# Patient Record
Sex: FEMALE | NOT HISPANIC OR LATINO | Employment: FULL TIME
[De-identification: names, ages, dates, MRNs, and addresses within clinical notes are randomized per-mention and may not be internally consistent; named-entity substitution may affect disease eponyms.]

---

## 2024-07-12 ENCOUNTER — TRANSCRIBE ORDERS (OUTPATIENT)
Dept: OTHER | Age: 54
End: 2024-07-12

## 2024-07-12 DIAGNOSIS — D05.12 DUCTAL CARCINOMA IN SITU OF LEFT BREAST: Primary | ICD-10-CM

## 2024-07-15 ENCOUNTER — PATIENT OUTREACH (OUTPATIENT)
Dept: ONCOLOGY | Facility: CLINIC | Age: 54
End: 2024-07-15
Payer: COMMERCIAL

## 2024-07-15 NOTE — PROGRESS NOTES
New Patient Oncology Nurse Navigator Note     Referring provider: Dr. Edward Michaels     Referring Clinic/Organization: [facility]     Referred to (specialty:) Radiation Oncology      Date Referral Received: July 15, 2024     Evaluation for:  D05.12 (ICD-10-CM) - Ductal carcinoma in situ of left breast     Clinical History (per Nurse review of records provided):      Josefa is a 53 year old female who had bilateral screening mammogram on 5/1/24. A focal asymmetry was identified in the upper outer quadrant, middle depth, approximately 6 cm from the nipple. Diagnostic imaging followed on 5/10 and additional spot compression views demonstrate persistence of the asymmetry.      Ultrasound at the 2 o'clock position 6 cm from the nipple demonstrates an ill-defined hypoechoic mass measuring 0.7 x 0.5 x 0.6 cm.     5/22/24 - Case: [id number]   A) LEFT BREAST, 2:00, 6 CM FROM NIPPLE, ULTRASOUND-GUIDED CORE BIOPSY:   1. Detached fragments of necrotic debris with associated rare atypical epithelial cells   2. Detached fragment of reactive appearing lymphoid tissue   3. See comment   Comment : A) Histologic sections show detached fragments of necrotic debris with rare associated atypical epithelial cells. The detached and minute nature of the fragments precludes definitive evaluation. The detached fragment of lymphoid tissue may represent sampling of a lymph node. Repeat biopsy sampling or conservative excision is recommended to further evaluate this process.     5/28/24 - Consult with Dr. Edward Michaels and proceeded with surgery.    7/9/24 -  Case: [id number]   A) LEFT BREAST, LUMPECTOMY:   1. Ductal carcinoma in situ (DCIS), nuclear grade 2, Cribriform type       a. Size: 10 mm       b. Margins: 2 mm from the posterior margin       c. Core biopsy site is associated with tumor   2. Breast Ancillary Testing: Performed on A7       a. Estrogen receptor: Positive (%, strong staining by manual morphometry)   3.  Negative for invasive malignancy     B) LEFT BREAST, MEDIAL/SUPERIOR MARGIN, RE-EXCISION WITH EVALUATION OF SURGICAL MARGINS:   1. Focal atypical ductal hyperplasia, >3 mm from all margins   2. Proliferative fibrocystic change   3. Negative for invasive carcinoma     Only [facility]ath[facility] PA scheduled for 7/17 and no med onc yet. Patient states they will schedule med onc consult once the rad onc is known.     Patient resides in [place], [place]. ([place] location)     7/15 0930 - Telephoned and spoke with Joseaf. Explained my role and purpose for the call. Writer received referral, reviewed for appropriate plan, and call warm transferred to New Patient Scheduling for completion.      Records Location: Care Everywhere, Media, and See Bookmarked material     Records Needed:  Path reports-biopsy and surgery (per protocol)  Pathology reviews (per protocol)  Breast imaging for past 5 years  Med onc notes, OP note, surgeons note (per protocol)

## 2024-07-19 ENCOUNTER — OFFICE VISIT (OUTPATIENT)
Dept: RADIATION ONCOLOGY | Facility: CLINIC | Age: 54
End: 2024-07-19
Attending: SURGERY
Payer: COMMERCIAL

## 2024-07-19 VITALS
TEMPERATURE: 98 F | OXYGEN SATURATION: 99 % | HEART RATE: 76 BPM | DIASTOLIC BLOOD PRESSURE: 68 MMHG | RESPIRATION RATE: 18 BRPM | SYSTOLIC BLOOD PRESSURE: 100 MMHG

## 2024-07-19 DIAGNOSIS — D05.12 DUCTAL CARCINOMA IN SITU (DCIS) OF LEFT BREAST: Primary | ICD-10-CM

## 2024-07-19 PROCEDURE — G2211 COMPLEX E/M VISIT ADD ON: HCPCS | Performed by: SURGERY

## 2024-07-19 PROCEDURE — 99205 OFFICE O/P NEW HI 60 MIN: CPT | Performed by: SURGERY

## 2024-07-19 PROCEDURE — 99417 PROLNG OP E/M EACH 15 MIN: CPT | Performed by: SURGERY

## 2024-07-19 RX ORDER — HYDROCHLOROTHIAZIDE 50 MG/1
1 TABLET ORAL DAILY
COMMUNITY
Start: 2024-03-01

## 2024-07-19 RX ORDER — LANSOPRAZOLE 30 MG/1
30 CAPSULE, DELAYED RELEASE ORAL
COMMUNITY
Start: 2024-03-01

## 2024-07-19 RX ORDER — LISINOPRIL 30 MG/1
30 TABLET ORAL
COMMUNITY
Start: 2024-03-01

## 2024-07-19 RX ORDER — ASPIRIN 81 MG/1
81 TABLET ORAL
COMMUNITY
Start: 2023-12-01

## 2024-07-19 RX ORDER — ATORVASTATIN CALCIUM 20 MG/1
1 TABLET, FILM COATED ORAL AT BEDTIME
COMMUNITY
Start: 2024-03-01

## 2024-07-19 RX ORDER — ORAL SEMAGLUTIDE 7 MG/1
14 TABLET ORAL
COMMUNITY
Start: 2023-10-30

## 2024-07-19 ASSESSMENT — PAIN SCALES - GENERAL: PAINLEVEL: NO PAIN (1)

## 2024-07-19 NOTE — LETTER
2024      Josefa Arnold  [address]  [place] [address]      Dear Colleague,    Thank you for referring your patient, Josefa Arnold, to the [facility]COL[facility] [place]. Please see a copy of my visit note below.       Department of Radiation Oncology  [facility]  [facility]  [address] [address] [place] [address]  [phone]       Consultation Note    Name: Josefa Arnold MRN: [id number]   : 1970   Date of Service: 2024 Referring: Dr. Michaels     Reason for consultation: LEFT Breast DCIS    History of Present Illness     Ms. Arnold is a 53 year old female with LEFT Stage 0 DCIS, ER+ s/p breast conservation surgery (Dr. Michaels, 2024) with pathology revealing ductal carcinoma in situ, grade 2, measuring 10 mm in size negative margins. She will be seeing Dr. Ryan for discussion of endocrine therapy.    Briefly, oncologic history is as follows:    Patient underwent a screening mammogram on 2024 which identified a focal asymmetry in the left breast.    A diagnostic mammogram and ultrasound was obtained on 2024 which revealed a persistent asymmetry in the left breast, with ultrasound demonstrating an ill-defined hypoechoic mass measuring up to 7 mm in size, at the 2 o'clock position, 6 cm from the nipple.  This is characterized as a BI-RADS 4 abnormality.    She underwent biopsy on 2024, with pathology returning as necrotic debris with atypical epithelial cells.    She underwent breast conservation surgery on 2024 with Dr. Michaels.  Pathology returned as a ductal carcinoma in situ, grade 2, measuring 10 mm in size, negative margins, ER positive.    She will be seeing Dr. Ryan medical oncology next week for discussion of endocrine therapy.    Today, patient denies any significant breast tenderness, pain, drainage, fevers, chills, extremity range of motion difficulties, chest pain,  dyspnea, or weight loss.     Past Medical History:   Past Medical History:   Diagnosis Date     DCIS (ductal carcinoma in situ) of breast 2024    Left Breast     Diabetes mellitus, type 2 (H)      HTN (hypertension)        Past Surgical History:   Past Surgical History:   Procedure Laterality Date     BREAST BIOPSY, CORE RT/LT Left 2024     BREAST LUMPECTOMY, RT/LT Left 2024    Dr. Edward Michaels      SECTION       EXTRACTION OF WISDOM TEETH       TONSILLECTOMY         Chemotherapy History:  No prior chemotherapy    Radiation History:  No prior radiation     Pregnant: No,Postmenopausal  Implanted Cardiac Devices: No    Medications:  Current Outpatient Medications   Medication Sig Dispense Refill     aspirin 81 MG EC tablet Take 81 mg by mouth       atorvastatin (LIPITOR) 20 MG tablet Take 1 tablet by mouth at bedtime       hydrochlorothiazide (HYDRODIURIL) 50 MG tablet Take 1 tablet by mouth daily       LANsoprazole (PREVACID) 30 MG DR capsule Take 30 mg by mouth       lisinopril (ZESTRIL) 30 MG tablet Take 30 mg by mouth       RYBELSUS 7 MG tablet 14 mg       No current facility-administered medications for this visit.         Allergies:     Allergies   Allergen Reactions     Nitrofurantoin Rash       Social History:  Social History     Tobacco Use     Smoking status: Never     Smokeless tobacco: Never   Substance Use Topics     Alcohol use: Not Currently     Drug use: Never         Family History:  Family History   Problem Relation Age of Onset     Breast Cancer Paternal Grandmother 92     Ovarian Cancer No family hx of      Prostate Cancer No family hx of        Review of Systems   A 10-point review of systems was performed. Pertinent findings are noted in the HPI.    Physical Exam   ECOG Status: 1    Vitals:  /68 (BP Location: Left arm, Patient Position: Chair, Cuff Size: Adult Large)   Pulse 76   Temp 98  F (36.7  C) (Oral)   Resp 18   SpO2 99%     Gen: Alert, in NAD  Head:  "NC/AT  Eyes: PERRL, EOMI, sclera anicteric  Ears: No external auricular lesions  Nose/sinus: No rhinorrhea or epistaxis  Breast: LEFT breast incision healing well, incision CDI, no dehiscence, no upper extremity range of motion limitation     Oral cavity/oropharynx: MMM, no visible oral cavity lesions  Neck: Full ROM, supple, no palpable adenopathy  Pulm: No wheezing, stridor or respiratory distress  CV: Extremities are warm and well-perfused, no cyanosis, no pedal edema  Abdominal: Normal bowel sounds, soft, nontender, no masses  Musculoskeletal: Normal bulk and tone  Skin: Normal color and turgor  Neuro: A/Ox3, CN II-XII intact, normal gait    Imaging/Path/Labs   Imaging: per HPI, personally reviewed and in agreement     Path: per HPI, personally reviewed and in agreement     Labs: per HPI, personally reviewed and in agreement     I have personally reviewed notes from Dr. Michaels    Assessment      Ms. Arnold is a 53 year old female with LEFT Stage 0 DCIS, ER+ s/p breast conservation surgery (Dr. Michaels, 7/9/2024) with pathology revealing ductal carcinoma in situ, grade 2, measuring 10 mm in size negative margins. She will be seeing Dr. Ryan for discussion of endocrine therapy.    We reviewed that the standard of care for the treatment of ductal carcinoma in-situ of the breast in the setting of breast conservation is consideration of adjuvant radiation of the breast. We discussed the etiology of the disease and the overall favorable prognosis of DCIS. In particular, we discussed the benefits of adjuvant radiation therapy would be for reduction in risk of intra-breast tumor recurrences (more DCIS or invasive carcinoma) by approximately 50% without an overall survival benefit (Dionisio et al, NSABP-17, NEJM 1993, JCO 1998; EBCTG [place]-analysis, JNCI, 2010).     With respect to omission of radiation, we discussed the results of RTOG 9804 which randomized women with \"low risk\" DCIS, defined as mammographically detected, " <2.5cm, grades 1-2, with surgical margins of at least 3mm, to whole breast irradiation versus observation. While there was an ipsilateral local recurrence benefit to radiation, risk of recurrence with observation is 11% vs 3% with radiation at 12 years.      The options of conventional fractionation versus hypofractionation were reviewed.  Potential options for omission of radiation, and the role of boost were also reviewed.     We discussed the potential acute and delayed toxicities of radiation therapy, as well as the logistics of daily radiation treatment.  The logistics of CT simulation were explained and options for set up, including supine versus prone positioning, and gating were explained.      Additional time was spent specifically discussing the additional risk of cardiovascular disease due to radiation therapy. We reviewed retrospective data attributing a RELATIVE RISK increase of 7.4% per Gy of mean heart dose (Shiloh et al., NEJ, 2013). We discussed the meaning of relative risk in the setting of any other risk factors for cardiovascular disease, irrespective of the cancer diagnosis and/or treatment. Finally, we reviewed treatment techniques utilized in modern radiation therapy to minimize radiation dose to the heart.    Plan     1. After extensive discussion, patient wishes to proceed with adjuvant LEFT whole breast radiation.     2. CT simulation in the near future, with plan to start two weeks after CT simulation is performed. Plan for 40Gy/15fx  without boost.     3. Patient will be with medical oncology (Dr Ryan) and to discuss endocrine therapy after radiation.    All benefits and risks discussed, and patient is in agreement with the oncologic plan discussed above.     Thank you for allowing me to participate in your patient's care.  If you should require any additional information, please do not hesitate in contacting me.        Arash Valverde MD  Department of Radiation Oncology  [facility]  [place]     A total of 90 minutes were spent on this visit, including preparation for this visit, face to face with patient, and medical decision making. Medical decision-making included consideration and possible diagnosis, management options, complex record review, review of diagnostic tests, consideration and discussion of significant complications based up medical history/comorbidities, and discussion with providers involved in care of the patient.         Again, thank you for allowing me to participate in the care of your patient.        Sincerely,        Arash Valverde MD

## 2024-07-19 NOTE — PATIENT INSTRUCTIONS
What to expect at your Simulation visit:    You will meet with a Radiation Therapist and other team members who will be doing a planning session called a  simulation  with you. This process will determine your daily treatment.    ~ You will lie on a flat table and have a treatment planning CT scan.  It is important during the scan to hold very still and breathe normally.    ~ Your therapist may construct a body mold to help you hold still for your treatments.    ~ If you are having treatment to the head or neck area you will be fitted with a plastic mesh mask that fits very snugly over your face and neck.     ~ Your therapist will be taking some digital photos that will go in your treatment chart.      ~Your therapist will make marks on your skin and take measurements. Your therapist may ask you about making small tattoos (a permanent small dot) over these marks.  These marks are used to position you daily for your radiation therapy treatments. Please do not wash off any marks until all of your radiation therapy treatments are complete unless you are instructed to do so by your therapist.    ~ Once the simulation is completed it can take from 3 to 10 business days before you start radiation therapy treatments.    ~ You may meet with a nurse who will go over management of treatment side effects and self care during your treatments. The nurse will help to plan care with other departments and physicians if needed.     Please contact [facility] RN with questions or concerns following today's appointment: [phone].       Please feel free to leave a detailed message if your call is not answered.    If your call is not received before 3:00 PM, it may not be returned until the following business day.    If you are receiving radiation treatment and need assistance after 3:00 PM or on the weekends, please call [phone] and ask to speak to the radiation oncologist on-call.    Thank  you!    Varsha MURO

## 2024-07-19 NOTE — NURSING NOTE
Oncology Rooming Note    July 19, 2024 1:06 PM   Josefa Arnold is a 53 year old female who presents for:    Chief Complaint   Patient presents with    Cancer     Radiation oncology consultation with Dr. Valverde     Initial Vitals: /68 (BP Location: Left arm, Patient Position: Chair, Cuff Size: Adult Large)   Pulse 76   Temp 98  F (36.7  C) (Oral)   Resp 18   SpO2 99%  There is no height or weight on file to calculate BMI. There is no height or weight on file to calculate BSA.  No Pain (1) Comment: Data Unavailable   No LMP recorded. Patient is postmenopausal.  Allergies reviewed: Yes  Medications reviewed: Yes    Medications: Medication refills not needed today.  Pharmacy name entered into EPIC: [id number] - [place], [place] [facility] [address]    Frailty Screening:   Is the patient here for a new oncology consult visit in cancer care? 1. Yes. Over the past month, have you experienced difficulty or required a caregiver to assist with:   1. Balance, walking or general mobility (including any falls)? NO  2. Completion of self-care tasks such as bathing, dressing, toileting, grooming/hygiene?  NO  3. Concentration or memory that affects your daily life?  NO       Clinical concerns: patient presents to clinic with  Gautam for radiation oncology new patient consultation with Dr. Arash Valverde.  Patient was seen in surgical follow-up on 7/17/2024, reports steri-strips intact at this time.  Patient reports she is scheduled for medical oncology consultation with Dr. Ryan on 7/22/2024.    Considerations for radiation treatment   Pregnancy status: Female with documented history of menopause  , patient reports menopause at age 49.  Implanted Cardiac Devices: No   Any previous radiation therapy: No        Dr. Arash Valverde was notified.      Varsha Rosales, RN BSN OCN CBCN

## 2024-07-22 NOTE — PROGRESS NOTES
Department of Radiation Oncology  [facility]: Cancer Center  [facility]  [address] [place] [address]  [phone]       Consultation Note    Name: Josefa Arnold MRN: [id number]   : 1970   Date of Service: 2024 Referring: Dr. Michaels     Reason for consultation: LEFT Breast DCIS    History of Present Illness     Ms. Arnold is a 53 year old female with LEFT Stage 0 DCIS, ER+ s/p breast conservation surgery (Dr. Michaels, 2024) with pathology revealing ductal carcinoma in situ, grade 2, measuring 10 mm in size negative margins. She will be seeing Dr. Ryan for discussion of endocrine therapy.    Briefly, oncologic history is as follows:    Patient underwent a screening mammogram on 2024 which identified a focal asymmetry in the left breast.    A diagnostic mammogram and ultrasound was obtained on 2024 which revealed a persistent asymmetry in the left breast, with ultrasound demonstrating an ill-defined hypoechoic mass measuring up to 7 mm in size, at the 2 o'clock position, 6 cm from the nipple.  This is characterized as a BI-RADS 4 abnormality.    She underwent biopsy on 2024, with pathology returning as necrotic debris with atypical epithelial cells.    She underwent breast conservation surgery on 2024 with Dr. Michaels.  Pathology returned as a ductal carcinoma in situ, grade 2, measuring 10 mm in size, negative margins, ER positive.    She will be seeing Dr. Ryan medical oncology next week for discussion of endocrine therapy.    Today, patient denies any significant breast tenderness, pain, drainage, fevers, chills, extremity range of motion difficulties, chest pain, dyspnea, or weight loss.     Past Medical History:   Past Medical History:   Diagnosis Date    DCIS (ductal carcinoma in situ) of breast 2024    Left Breast    Diabetes mellitus, type 2 (H)     HTN (hypertension)        Past Surgical History:   Past  Surgical History:   Procedure Laterality Date    BREAST BIOPSY, CORE RT/LT Left 2024    BREAST LUMPECTOMY, RT/LT Left 2024    Dr. Edward Michaels     SECTION      EXTRACTION OF WISDOM TEETH      TONSILLECTOMY         Chemotherapy History:  No prior chemotherapy    Radiation History:  No prior radiation     Pregnant: No,Postmenopausal  Implanted Cardiac Devices: No    Medications:  Current Outpatient Medications   Medication Sig Dispense Refill    aspirin 81 MG EC tablet Take 81 mg by mouth      atorvastatin (LIPITOR) 20 MG tablet Take 1 tablet by mouth at bedtime      hydrochlorothiazide (HYDRODIURIL) 50 MG tablet Take 1 tablet by mouth daily      LANsoprazole (PREVACID) 30 MG DR capsule Take 30 mg by mouth      lisinopril (ZESTRIL) 30 MG tablet Take 30 mg by mouth      RYBELSUS 7 MG tablet 14 mg       No current facility-administered medications for this visit.         Allergies:     Allergies   Allergen Reactions    Nitrofurantoin Rash       Social History:  Social History     Tobacco Use    Smoking status: Never    Smokeless tobacco: Never   Substance Use Topics    Alcohol use: Not Currently    Drug use: Never         Family History:  Family History   Problem Relation Age of Onset    Breast Cancer Paternal Grandmother 92    Ovarian Cancer No family hx of     Prostate Cancer No family hx of        Review of Systems   A 10-point review of systems was performed. Pertinent findings are noted in the HPI.    Physical Exam   ECOG Status: 1    Vitals:  /68 (BP Location: Left arm, Patient Position: Chair, Cuff Size: Adult Large)   Pulse 76   Temp 98  F (36.7  C) (Oral)   Resp 18   SpO2 99%     Gen: Alert, in NAD  Head: NC/AT  Eyes: PERRL, EOMI, sclera anicteric  Ears: No external auricular lesions  Nose/sinus: No rhinorrhea or epistaxis  Breast: LEFT breast incision healing well, incision CDI, no dehiscence, no upper extremity range of motion limitation     Oral cavity/oropharynx: MMM, no visible  "oral cavity lesions  Neck: Full ROM, supple, no palpable adenopathy  Pulm: No wheezing, stridor or respiratory distress  CV: Extremities are warm and well-perfused, no cyanosis, no pedal edema  Abdominal: Normal bowel sounds, soft, nontender, no masses  Musculoskeletal: Normal bulk and tone  Skin: Normal color and turgor  Neuro: A/Ox3, CN II-XII intact, normal gait    Imaging/Path/Labs   Imaging: per HPI, personally reviewed and in agreement     Path: per HPI, personally reviewed and in agreement     Labs: per HPI, personally reviewed and in agreement     I have personally reviewed notes from Dr. Michaels    Assessment      Ms. Arnold is a 53 year old female with LEFT Stage 0 DCIS, ER+ s/p breast conservation surgery (Dr. Michaels, 7/9/2024) with pathology revealing ductal carcinoma in situ, grade 2, measuring 10 mm in size negative margins. She will be seeing Dr. Ryan for discussion of endocrine therapy.    We reviewed that the standard of care for the treatment of ductal carcinoma in-situ of the breast in the setting of breast conservation is consideration of adjuvant radiation of the breast. We discussed the etiology of the disease and the overall favorable prognosis of DCIS. In particular, we discussed the benefits of adjuvant radiation therapy would be for reduction in risk of intra-breast tumor recurrences (more DCIS or invasive carcinoma) by approximately 50% without an overall survival benefit (Dionisio et al, NSABP-17, NEJ 1993, JCO 1998; EBCTG [place]-analysis, JNCI, 2010).     With respect to omission of radiation, we discussed the results of RTOG 9804 which randomized women with \"low risk\" DCIS, defined as mammographically detected, <2.5cm, grades 1-2, with surgical margins of at least 3mm, to whole breast irradiation versus observation. While there was an ipsilateral local recurrence benefit to radiation, risk of recurrence with observation is 11% vs 3% with radiation at 12 years.      The options of conventional " fractionation versus hypofractionation were reviewed.  Potential options for omission of radiation, and the role of boost were also reviewed.     We discussed the potential acute and delayed toxicities of radiation therapy, as well as the logistics of daily radiation treatment.  The logistics of CT simulation were explained and options for set up, including supine versus prone positioning, and gating were explained.      Additional time was spent specifically discussing the additional risk of cardiovascular disease due to radiation therapy. We reviewed retrospective data attributing a RELATIVE RISK increase of 7.4% per Gy of mean heart dose (Shiloh et al., NEJ, 2013). We discussed the meaning of relative risk in the setting of any other risk factors for cardiovascular disease, irrespective of the cancer diagnosis and/or treatment. Finally, we reviewed treatment techniques utilized in modern radiation therapy to minimize radiation dose to the heart.    Plan     1. After extensive discussion, patient wishes to proceed with adjuvant LEFT whole breast radiation.     2. CT simulation in the near future, with plan to start two weeks after CT simulation is performed. Plan for 40Gy/15fx  without boost.     3. Patient will be with medical oncology (Dr Ryan) and to discuss endocrine therapy after radiation.    All benefits and risks discussed, and patient is in agreement with the oncologic plan discussed above.     Thank you for allowing me to participate in your patient's care.  If you should require any additional information, please do not hesitate in contacting me.        Arash Valverde MD  Department of Radiation Oncology  [facility]     A total of 90 minutes were spent on this visit, including preparation for this visit, face to face with patient, and medical decision making. Medical decision-making included consideration and possible diagnosis, management options, complex record review, review of diagnostic  tests, consideration and discussion of significant complications based up medical history/comorbidities, and discussion with providers involved in care of the patient.

## 2024-08-01 ENCOUNTER — APPOINTMENT (OUTPATIENT)
Dept: RADIATION ONCOLOGY | Facility: CLINIC | Age: 54
End: 2024-08-01
Payer: COMMERCIAL

## 2024-08-01 PROCEDURE — 77263 THER RADIOLOGY TX PLNG CPLX: CPT | Performed by: SURGERY

## 2024-08-02 ENCOUNTER — OFFICE VISIT (OUTPATIENT)
Dept: RADIATION ONCOLOGY | Facility: CLINIC | Age: 54
End: 2024-08-02
Payer: COMMERCIAL

## 2024-08-02 DIAGNOSIS — D05.12 DUCTAL CARCINOMA IN SITU (DCIS) OF LEFT BREAST: Primary | ICD-10-CM

## 2024-08-02 PROCEDURE — 99207 PR NO CHARGE LOS: CPT | Performed by: SURGERY

## 2024-08-02 PROCEDURE — 77290 THER RAD SIMULAJ FIELD CPLX: CPT | Performed by: SURGERY

## 2024-08-02 PROCEDURE — 77332 RADIATION TREATMENT AID(S): CPT | Performed by: SURGERY

## 2024-08-02 NOTE — LETTER
8/2/2024      Josefa Arnold  [address]  [place] [address]      Dear Colleague,    Thank you for referring your patient, Josefa Arnold, to the [facility]ATION [facility]COL[facility] [place]. Please see a copy of my visit note below.    A CT simulation was performed today for radiation therapy planning. See [facility] for additional details.     Arash Valverde M.D.  Department of Radiation Oncology  [facility]       Again, thank you for allowing me to participate in the care of your patient.        Sincerely,        Arash Valverde MD  
negative - no change in level of consciousness

## 2024-08-02 NOTE — PROGRESS NOTES
A CT simulation was performed today for radiation therapy planning. See [facility] for additional details.     Arash Valverde M.D.  Department of Radiation Oncology  [facility]

## 2024-08-12 ENCOUNTER — APPOINTMENT (OUTPATIENT)
Dept: RADIATION ONCOLOGY | Facility: CLINIC | Age: 54
End: 2024-08-12
Payer: COMMERCIAL

## 2024-08-12 PROCEDURE — 77334 RADIATION TREATMENT AID(S): CPT | Performed by: SURGERY

## 2024-08-12 PROCEDURE — 77300 RADIATION THERAPY DOSE PLAN: CPT | Performed by: SURGERY

## 2024-08-12 PROCEDURE — 77293 RESPIRATOR MOTION MGMT SIMUL: CPT | Performed by: SURGERY

## 2024-08-12 PROCEDURE — 77295 3-D RADIOTHERAPY PLAN: CPT | Performed by: SURGERY

## 2024-08-14 ENCOUNTER — ALLIED HEALTH/NURSE VISIT (OUTPATIENT)
Dept: RADIATION ONCOLOGY | Facility: CLINIC | Age: 54
End: 2024-08-14
Payer: COMMERCIAL

## 2024-08-14 ENCOUNTER — APPOINTMENT (OUTPATIENT)
Dept: RADIATION ONCOLOGY | Facility: CLINIC | Age: 54
End: 2024-08-14
Payer: COMMERCIAL

## 2024-08-14 DIAGNOSIS — D05.12 DUCTAL CARCINOMA IN SITU (DCIS) OF LEFT BREAST: Primary | ICD-10-CM

## 2024-08-14 PROCEDURE — 77280 THER RAD SIMULAJ FIELD SMPL: CPT | Performed by: SURGERY

## 2024-08-14 PROCEDURE — 99207 PR NO CHARGE NURSE ONLY: CPT

## 2024-08-14 NOTE — NURSING NOTE
Teaching Flowsheet   Relevant Diagnosis: breast cancer  Teaching Topic: radiation therapy     Person(s) involved in teaching:   Patient     Motivation Level:  Asks Questions: Yes  Eager to Learn: Yes  Cooperative: Yes  Receptive (willing/able to accept information): Yes  Any cultural factors/Mormon beliefs that may influence understanding or compliance? No       Patient demonstrates understanding of the following:  Reason for the appointment, diagnosis and treatment plan: Yes  Knowledge of proper use of medications and conditions for which they are ordered (with special attention to potential side effects or drug interactions): Yes  Which situations necessitate calling provider and whom to contact: Yes       Teaching Concerns Addressed:   Comments: radiation therapy side effects: fatigue, skin changes and skin cares     Proper use and care of  (medical equip, care aids, etc.): NA  Nutritional needs and diet plan: Yes  Pain management techniques: Yes  Wound Care: Yes  How and/when to access community resources: Yes     Instructional Materials Used/Given: Radiation therapy side effect handouts provided: fatigue, skin changes and skin cares     Time spent with patient: 15 minutes.    Varsha Rosales RN BSN OCN CBCN

## 2024-08-14 NOTE — PATIENT INSTRUCTIONS
Please contact [facility] RN with questions or concerns following today's appointment: [phone].       Please feel free to leave a detailed message if your call is not answered.    If your call is not received before 3:00 PM, it may not be returned until the following business day.    If you are receiving radiation treatment and need assistance after 3:00 PM or on the weekends, please call [phone] and ask to speak to the radiation oncologist on-call.    Thank you!    [facility] Radiation Oncology Nursing

## 2024-08-15 ENCOUNTER — APPOINTMENT (OUTPATIENT)
Dept: RADIATION ONCOLOGY | Facility: CLINIC | Age: 54
End: 2024-08-15
Payer: COMMERCIAL

## 2024-08-15 PROCEDURE — 77412 RADIATION TX DELIVERY LVL 3: CPT | Performed by: SURGERY

## 2024-08-16 ENCOUNTER — APPOINTMENT (OUTPATIENT)
Dept: RADIATION ONCOLOGY | Facility: CLINIC | Age: 54
End: 2024-08-16
Payer: COMMERCIAL

## 2024-08-16 PROCEDURE — 77387 GUIDANCE FOR RADJ TX DLVR: CPT | Performed by: SURGERY

## 2024-08-16 PROCEDURE — 77412 RADIATION TX DELIVERY LVL 3: CPT | Performed by: SURGERY

## 2024-08-19 ENCOUNTER — APPOINTMENT (OUTPATIENT)
Dept: RADIATION ONCOLOGY | Facility: CLINIC | Age: 54
End: 2024-08-19
Payer: COMMERCIAL

## 2024-08-19 PROCEDURE — 77412 RADIATION TX DELIVERY LVL 3: CPT | Performed by: RADIOLOGY

## 2024-08-20 ENCOUNTER — APPOINTMENT (OUTPATIENT)
Dept: RADIATION ONCOLOGY | Facility: CLINIC | Age: 54
End: 2024-08-20
Payer: COMMERCIAL

## 2024-08-20 PROCEDURE — 77387 GUIDANCE FOR RADJ TX DLVR: CPT | Performed by: RADIOLOGY

## 2024-08-20 PROCEDURE — 77412 RADIATION TX DELIVERY LVL 3: CPT | Performed by: RADIOLOGY

## 2024-08-21 ENCOUNTER — OFFICE VISIT (OUTPATIENT)
Dept: RADIATION ONCOLOGY | Facility: CLINIC | Age: 54
End: 2024-08-21
Payer: COMMERCIAL

## 2024-08-21 ENCOUNTER — APPOINTMENT (OUTPATIENT)
Dept: RADIATION ONCOLOGY | Facility: CLINIC | Age: 54
End: 2024-08-21
Payer: COMMERCIAL

## 2024-08-21 VITALS
HEART RATE: 84 BPM | RESPIRATION RATE: 18 BRPM | OXYGEN SATURATION: 98 % | WEIGHT: 236.7 LBS | TEMPERATURE: 97.9 F | SYSTOLIC BLOOD PRESSURE: 113 MMHG | DIASTOLIC BLOOD PRESSURE: 76 MMHG

## 2024-08-21 DIAGNOSIS — D05.12 DUCTAL CARCINOMA IN SITU (DCIS) OF LEFT BREAST: Primary | ICD-10-CM

## 2024-08-21 PROCEDURE — 77412 RADIATION TX DELIVERY LVL 3: CPT | Performed by: SURGERY

## 2024-08-21 PROCEDURE — 77427 RADIATION TX MANAGEMENT X5: CPT | Performed by: SURGERY

## 2024-08-21 PROCEDURE — 77336 RADIATION PHYSICS CONSULT: CPT | Performed by: SURGERY

## 2024-08-21 PROCEDURE — 99207 PR DROP WITH A PROCEDURE: CPT | Performed by: SURGERY

## 2024-08-21 PROCEDURE — 77387 GUIDANCE FOR RADJ TX DLVR: CPT | Performed by: SURGERY

## 2024-08-21 ASSESSMENT — PAIN SCALES - GENERAL: PAINLEVEL: NO PAIN (0)

## 2024-08-21 NOTE — LETTER
2024      Josefa Arnold  [address]  [place] [address]      Dear Colleague,    Thank you for referring your patient, Josefa Arnold, to the [facility] RADIATION ONCOLOGY [place]. Please see a copy of my visit note below.    [facility] PHYSICIANS  SPECIALIZING IN BREAKTHROUGHS  Radiation Oncology    On Treatment Visit Note      Josefa Arnold      Date: Aug 21, 2024   MRN: [id number]   : 1970  Diagnosis: breast cancer ER (+)        ID: Ms. Arnold is a 53 year old female with LEFT Stage 0 DCIS, ER+ s/p breast conservation surgery (Dr. Michaels, 2024) with pathology revealing ductal carcinoma in situ, grade 2, measuring 10 mm in size negative margins. She will be seeing Dr. Ryan for discussion of endocrine therapy.  Plan for 40Gy/15fx without boost.     Reason for Visit:  On Radiation Treatment Visit       Treatment Summary to Date  Treatment Site: left breast Current Dose: 1335/4005 cGy Fractions: 5/15        Chemotherapy  Chemo concurrent with radx?: No (Dr. Ryan)    Subjective:   No complaints, tolerating RT well overall.    Nursing ROS:   Nutrition Alteration  Diet Type: Patient's Preference  Skin  Skin Reaction: 1 - Faint erythema or dry desquamation (very faint, no desquamation)  Skin Intervention: patient reports applying Aquaphor two times daily        Cardiovascular  Respiratory effort: 1 - Normal - without distress        Psychosocial  Mood - Anxiety: 0 - Normal  Mood - Depression: 0 - Normal  Psychosocial Note: energy level at baseline  Pain Assessment  0-10 Pain Scale: 0  Pain Note: patient reports intermittent nerve pains of left breast - reviewed, denies need for medication management      Objective:   /76 (BP Location: Left arm, Patient Position: Chair, Cuff Size: Adult Regular)   Pulse 84   Temp 97.9  F (36.6  C) (Oral)   Resp 18   Wt 107.4 kg (236 lb 11.2 oz)   SpO2 98%   Gen: Appears well, in no acute distress  Skin: mild erythema, no skin  "breakdown   CV/Resp: rrr, breathing comfortably on room air  Neuro: CN 2-12 grossly intact, UE/LE full strength     Labs:  CBC RESULTS: No results for input(s): \"WBC\", \"RBC\", \"HGB\", \"HCT\", \"MCV\", \"MCH\", \"MCHC\", \"RDW\", \"PLT\" in the last 60488 hours.  ELECTROLYTES:  No results for input(s): \"NA\", \"POTASSIUM\", \"CHLORIDE\", \"MAYA\", \"CO2\", \"BUN\", \"CR\", \"GLC\" in the last 60092 hours.    Assessment:    Tolerating radiation therapy well.  All questions and concerns addressed.      Plan:   Continue current therapy.        Mosaiq chart and setup information reviewed  Ports checked and MVCT/IGRT images checked    Medication Review  Med list reviewed with patient?: Yes  Med list printed and given: Offered and declined    Educational Topic Discussed  Education Instructions: radiation therapy side effects: fatigue, skin changes and skin cares      Arash Valverde MD  Radiation Oncology   [facility]  Clinic: [phone]        Again, thank you for allowing me to participate in the care of your patient.        Sincerely,        Arash Valverde MD  "

## 2024-08-21 NOTE — PATIENT INSTRUCTIONS
Please contact [facility] RN with questions or concerns following today's appointment: [phone].       Please feel free to leave a detailed message if your call is not answered.    If your call is not received before 3:00 PM, it may not be returned until the following business day.    If you are receiving radiation treatment and need assistance after 3:00 PM or on the weekends, please call [phone] and ask to speak to the radiation oncologist on-call.    Thank you!    [facility] Radiation Oncology Nursing

## 2024-08-21 NOTE — PROGRESS NOTES
"[facility] PHYSICIANS  SPECIALIZING IN BREAKTHROUGHS  Radiation Oncology    On Treatment Visit Note      Josefa Arnold      Date: Aug 21, 2024   MRN: [id number]   : 1970  Diagnosis: breast cancer ER (+)        ID: Ms. Arnold is a 53 year old female with LEFT Stage 0 DCIS, ER+ s/p breast conservation surgery (Dr. Michaels, 2024) with pathology revealing ductal carcinoma in situ, grade 2, measuring 10 mm in size negative margins. She will be seeing Dr. Ryan for discussion of endocrine therapy.  Plan for 40Gy/15fx without boost.     Reason for Visit:  On Radiation Treatment Visit       Treatment Summary to Date  Treatment Site: left breast Current Dose: 1335/4005 cGy Fractions: 5/15        Chemotherapy  Chemo concurrent with radx?: No (Dr. Ryan)    Subjective:   No complaints, tolerating RT well overall.    Nursing ROS:   Nutrition Alteration  Diet Type: Patient's Preference  Skin  Skin Reaction: 1 - Faint erythema or dry desquamation (very faint, no desquamation)  Skin Intervention: patient reports applying Aquaphor two times daily        Cardiovascular  Respiratory effort: 1 - Normal - without distress        Psychosocial  Mood - Anxiety: 0 - Normal  Mood - Depression: 0 - Normal  Psychosocial Note: energy level at baseline  Pain Assessment  0-10 Pain Scale: 0  Pain Note: patient reports intermittent nerve pains of left breast - reviewed, denies need for medication management      Objective:   /76 (BP Location: Left arm, Patient Position: Chair, Cuff Size: Adult Regular)   Pulse 84   Temp 97.9  F (36.6  C) (Oral)   Resp 18   Wt 107.4 kg (236 lb 11.2 oz)   SpO2 98%   Gen: Appears well, in no acute distress  Skin: mild erythema, no skin breakdown   CV/Resp: rrr, breathing comfortably on room air  Neuro: CN 2-12 grossly intact, UE/LE full strength     Labs:  CBC RESULTS: No results for input(s): \"WBC\", \"RBC\", \"HGB\", \"HCT\", \"MCV\", \"MCH\", \"MCHC\", \"RDW\", \"PLT\" in the last 67747 " "hours.  ELECTROLYTES:  No results for input(s): \"NA\", \"POTASSIUM\", \"CHLORIDE\", \"MAYA\", \"CO2\", \"BUN\", \"CR\", \"GLC\" in the last 49949 hours.    Assessment:    Tolerating radiation therapy well.  All questions and concerns addressed.      Plan:   Continue current therapy.        Mosaiq chart and setup information reviewed  Ports checked and MVCT/IGRT images checked    Medication Review  Med list reviewed with patient?: Yes  Med list printed and given: Offered and declined    Educational Topic Discussed  Education Instructions: radiation therapy side effects: fatigue, skin changes and skin cares      Arash Valverde MD  Radiation Oncology   [facility]  Clinic: [phone]      "

## 2024-08-22 ENCOUNTER — APPOINTMENT (OUTPATIENT)
Dept: RADIATION ONCOLOGY | Facility: CLINIC | Age: 54
End: 2024-08-22
Payer: COMMERCIAL

## 2024-08-22 PROCEDURE — 77412 RADIATION TX DELIVERY LVL 3: CPT | Performed by: SURGERY

## 2024-08-22 PROCEDURE — 77387 GUIDANCE FOR RADJ TX DLVR: CPT | Performed by: SURGERY

## 2024-08-23 ENCOUNTER — APPOINTMENT (OUTPATIENT)
Dept: RADIATION ONCOLOGY | Facility: CLINIC | Age: 54
End: 2024-08-23
Payer: COMMERCIAL

## 2024-08-23 PROCEDURE — 77412 RADIATION TX DELIVERY LVL 3: CPT | Performed by: SURGERY

## 2024-08-26 ENCOUNTER — APPOINTMENT (OUTPATIENT)
Dept: RADIATION ONCOLOGY | Facility: CLINIC | Age: 54
End: 2024-08-26
Payer: COMMERCIAL

## 2024-08-26 PROCEDURE — 77412 RADIATION TX DELIVERY LVL 3: CPT | Performed by: RADIOLOGY

## 2024-08-26 PROCEDURE — 77387 GUIDANCE FOR RADJ TX DLVR: CPT | Performed by: RADIOLOGY

## 2024-08-27 ENCOUNTER — APPOINTMENT (OUTPATIENT)
Dept: RADIATION ONCOLOGY | Facility: CLINIC | Age: 54
End: 2024-08-27
Payer: COMMERCIAL

## 2024-08-27 PROCEDURE — 77412 RADIATION TX DELIVERY LVL 3: CPT | Performed by: SURGERY

## 2024-08-27 PROCEDURE — 77387 GUIDANCE FOR RADJ TX DLVR: CPT | Performed by: SURGERY

## 2024-08-28 ENCOUNTER — APPOINTMENT (OUTPATIENT)
Dept: RADIATION ONCOLOGY | Facility: CLINIC | Age: 54
End: 2024-08-28
Payer: COMMERCIAL

## 2024-08-28 ENCOUNTER — OFFICE VISIT (OUTPATIENT)
Dept: RADIATION ONCOLOGY | Facility: CLINIC | Age: 54
End: 2024-08-28
Payer: COMMERCIAL

## 2024-08-28 VITALS
TEMPERATURE: 97.9 F | OXYGEN SATURATION: 100 % | RESPIRATION RATE: 18 BRPM | DIASTOLIC BLOOD PRESSURE: 80 MMHG | WEIGHT: 234.6 LBS | HEART RATE: 69 BPM | SYSTOLIC BLOOD PRESSURE: 119 MMHG

## 2024-08-28 DIAGNOSIS — D05.12 DUCTAL CARCINOMA IN SITU (DCIS) OF LEFT BREAST: Primary | ICD-10-CM

## 2024-08-28 PROCEDURE — 77336 RADIATION PHYSICS CONSULT: CPT | Performed by: SURGERY

## 2024-08-28 PROCEDURE — 77412 RADIATION TX DELIVERY LVL 3: CPT | Performed by: SURGERY

## 2024-08-28 PROCEDURE — 77427 RADIATION TX MANAGEMENT X5: CPT | Performed by: SURGERY

## 2024-08-28 PROCEDURE — 77387 GUIDANCE FOR RADJ TX DLVR: CPT | Performed by: SURGERY

## 2024-08-28 PROCEDURE — 99207 PR DROP WITH A PROCEDURE: CPT | Performed by: SURGERY

## 2024-08-28 ASSESSMENT — PAIN SCALES - GENERAL: PAINLEVEL: NO PAIN (0)

## 2024-08-28 NOTE — PATIENT INSTRUCTIONS
Please contact [facility] RN with questions or concerns following today's appointment: [phone].       Please feel free to leave a detailed message if your call is not answered.    If your call is not received before 3:00 PM, it may not be returned until the following business day.    If you are receiving radiation treatment and need assistance after 3:00 PM or on the weekends, please call [phone] and ask to speak to the radiation oncologist on-call.    Thank you!    [facility] Radiation Oncology Nursing

## 2024-08-28 NOTE — LETTER
2024      Josefa Arnold  [address]  [place] [address]      Dear Colleague,    Thank you for referring your patient, Josefa Arnold, to the [facility] RADIATION ONCOLOGY [place]. Please see a copy of my visit note below.    [facility] PHYSICIANS  SPECIALIZING IN BREAKTHROUGHS  Radiation Oncology    On Treatment Visit Note      Josefa Arnold      Date: Aug 28, 2024   MRN: [id number]   : 1970  Diagnosis: breast cancer ER (+)        ID: Ms. Arnold is a 53 year old female with LEFT Stage 0 DCIS, ER+ s/p breast conservation surgery (Dr. Michaels, 2024) with pathology revealing ductal carcinoma in situ, grade 2, measuring 10 mm in size negative margins. She will be seeing Dr. Ryan for discussion of endocrine therapy.  Plan for 40Gy/15fx without boost.     Reason for Visit:  On Radiation Treatment Visit       Treatment Summary to Date  Treatment Site: left breast Current Dose: 2670/4005 cGy Fractions: 10/15        Chemotherapy  Chemo concurrent with radx?: No (Dr. Ryan)    Subjective:   No complaints, tolerating RT well overall.    Nursing ROS:   Nutrition Alteration  Diet Type: Patient's Preference  Skin  Skin Reaction: 1 - Faint erythema or dry desquamation (no desquamation)  Skin Intervention: patient reports applying Aquaphor two times daily        Cardiovascular  Respiratory effort: 1 - Normal - without distress        Psychosocial  Mood - Anxiety: 0 - Normal  Mood - Depression: 0 - Normal  Psychosocial Note: increasing fatigue per patient  Pain Assessment  0-10 Pain Scale: 0  Pain Note: patient reports intermittent nerve pains of left breast - reviewed, denies need for medication management      Objective:   /80 (BP Location: Left arm, Patient Position: Chair, Cuff Size: Adult Large)   Pulse 69   Temp 97.9  F (36.6  C) (Oral)   Resp 18   Wt 106.4 kg (234 lb 9.6 oz)   SpO2 100%   Gen: Appears well, in no acute distress  Skin: mild erythema, no skin breakdown  "  CV/Resp: rrr, breathing comfortably on room air  Neuro: CN 2-12 grossly intact, UE/LE full strength     Labs:  CBC RESULTS: No results for input(s): \"WBC\", \"RBC\", \"HGB\", \"HCT\", \"MCV\", \"MCH\", \"MCHC\", \"RDW\", \"PLT\" in the last 66238 hours.  ELECTROLYTES:  No results for input(s): \"NA\", \"POTASSIUM\", \"CHLORIDE\", \"MAYA\", \"CO2\", \"BUN\", \"CR\", \"GLC\" in the last 55807 hours.    Assessment:    Tolerating radiation therapy well.  All questions and concerns addressed.      Plan:   Continue current therapy.        Mosaiq chart and setup information reviewed  Ports checked and MVCT/IGRT images checked    Medication Review  Med list reviewed with patient?: Yes  Med list printed and given: Offered and declined    Educational Topic Discussed  Education Instructions: radiation therapy side effects: fatigue, skin changes and skin cares      Arash Valverde MD  Radiation Oncology   [facility]: [phone]        Again, thank you for allowing me to participate in the care of your patient.        Sincerely,        Arash Valverde MD  "

## 2024-08-29 ENCOUNTER — APPOINTMENT (OUTPATIENT)
Dept: RADIATION ONCOLOGY | Facility: CLINIC | Age: 54
End: 2024-08-29
Payer: COMMERCIAL

## 2024-08-29 PROCEDURE — 77412 RADIATION TX DELIVERY LVL 3: CPT | Performed by: SURGERY

## 2024-08-29 PROCEDURE — 77387 GUIDANCE FOR RADJ TX DLVR: CPT | Performed by: SURGERY

## 2024-08-29 NOTE — PROGRESS NOTES
"[facility] PHYSICIANS  SPECIALIZING IN BREAKTHROUGHS  Radiation Oncology    On Treatment Visit Note      Josefa Arnold      Date: Aug 28, 2024   MRN: [id number]   : 1970  Diagnosis: breast cancer ER (+)        ID: Ms. Arnold is a 53 year old female with LEFT Stage 0 DCIS, ER+ s/p breast conservation surgery (Dr. Michaels, 2024) with pathology revealing ductal carcinoma in situ, grade 2, measuring 10 mm in size negative margins. She will be seeing Dr. Ryan for discussion of endocrine therapy.  Plan for 40Gy/15fx without boost.     Reason for Visit:  On Radiation Treatment Visit       Treatment Summary to Date  Treatment Site: left breast Current Dose: 2670/4005 cGy Fractions: 10/15        Chemotherapy  Chemo concurrent with radx?: No (Dr. Ryan)    Subjective:   No complaints, tolerating RT well overall.    Nursing ROS:   Nutrition Alteration  Diet Type: Patient's Preference  Skin  Skin Reaction: 1 - Faint erythema or dry desquamation (no desquamation)  Skin Intervention: patient reports applying Aquaphor two times daily        Cardiovascular  Respiratory effort: 1 - Normal - without distress        Psychosocial  Mood - Anxiety: 0 - Normal  Mood - Depression: 0 - Normal  Psychosocial Note: increasing fatigue per patient  Pain Assessment  0-10 Pain Scale: 0  Pain Note: patient reports intermittent nerve pains of left breast - reviewed, denies need for medication management      Objective:   /80 (BP Location: Left arm, Patient Position: Chair, Cuff Size: Adult Large)   Pulse 69   Temp 97.9  F (36.6  C) (Oral)   Resp 18   Wt 106.4 kg (234 lb 9.6 oz)   SpO2 100%   Gen: Appears well, in no acute distress  Skin: mild erythema, no skin breakdown   CV/Resp: rrr, breathing comfortably on room air  Neuro: CN 2-12 grossly intact, UE/LE full strength     Labs:  CBC RESULTS: No results for input(s): \"WBC\", \"RBC\", \"HGB\", \"HCT\", \"MCV\", \"MCH\", \"MCHC\", \"RDW\", \"PLT\" in the last 02241 " "hours.  ELECTROLYTES:  No results for input(s): \"NA\", \"POTASSIUM\", \"CHLORIDE\", \"MAYA\", \"CO2\", \"BUN\", \"CR\", \"GLC\" in the last 50365 hours.    Assessment:    Tolerating radiation therapy well.  All questions and concerns addressed.      Plan:   Continue current therapy.        Mosaiq chart and setup information reviewed  Ports checked and MVCT/IGRT images checked    Medication Review  Med list reviewed with patient?: Yes  Med list printed and given: Offered and declined    Educational Topic Discussed  Education Instructions: radiation therapy side effects: fatigue, skin changes and skin cares      Arash Valverde MD  Radiation Oncology   [facility]  Clinic: [phone]      "

## 2024-08-30 ENCOUNTER — APPOINTMENT (OUTPATIENT)
Dept: RADIATION ONCOLOGY | Facility: CLINIC | Age: 54
End: 2024-08-30
Payer: COMMERCIAL

## 2024-08-30 ENCOUNTER — OFFICE VISIT (OUTPATIENT)
Dept: RADIATION ONCOLOGY | Facility: CLINIC | Age: 54
End: 2024-08-30
Payer: COMMERCIAL

## 2024-08-30 VITALS
TEMPERATURE: 97.6 F | SYSTOLIC BLOOD PRESSURE: 116 MMHG | WEIGHT: 235 LBS | DIASTOLIC BLOOD PRESSURE: 71 MMHG | RESPIRATION RATE: 18 BRPM | OXYGEN SATURATION: 98 % | HEART RATE: 74 BPM

## 2024-08-30 DIAGNOSIS — D05.12 DUCTAL CARCINOMA IN SITU (DCIS) OF LEFT BREAST: Primary | ICD-10-CM

## 2024-08-30 PROCEDURE — 99207 PR DROP WITH A PROCEDURE: CPT | Performed by: SURGERY

## 2024-08-30 PROCEDURE — 77412 RADIATION TX DELIVERY LVL 3: CPT | Performed by: SURGERY

## 2024-08-30 PROCEDURE — 77387 GUIDANCE FOR RADJ TX DLVR: CPT | Performed by: SURGERY

## 2024-08-30 ASSESSMENT — PAIN SCALES - GENERAL: PAINLEVEL: NO PAIN (0)

## 2024-08-30 NOTE — PROGRESS NOTES
"[facility] PHYSICIANS  SPECIALIZING IN BREAKTHROUGHS  Radiation Oncology    On Treatment Visit Note      Josefa Arnold      Date: Aug 30, 2024   MRN: [id number]   : 1970  Diagnosis: breast cancer ER (+)        ID: Ms. Arnold is a 53 year old female with LEFT Stage 0 DCIS, ER+ s/p breast conservation surgery (Dr. Michaels, 2024) with pathology revealing ductal carcinoma in situ, grade 2, measuring 10 mm in size negative margins. She will be seeing Dr. Ryan for discussion of endocrine therapy.  Plan for 40Gy/15fx without boost.     Reason for Visit:  On Radiation Treatment Visit       Treatment Summary to Date  Treatment Site: left breast Current Dose: 3204/4005 cGy Fractions: 12/15        Chemotherapy  Chemo concurrent with radx?: No (Dr. Ryan)    Subjective:   No complaints, tolerating RT well overall.    Nursing ROS:   Nutrition Alteration  Diet Type: Patient's Preference  Skin  Skin Reaction: 1 - Faint erythema or dry desquamation (no desquamation)  Skin Intervention: patient reports applying Aquaphor two times daily        Cardiovascular  Respiratory effort: 1 - Normal - without distress        Psychosocial  Mood - Anxiety: 0 - Normal  Mood - Depression: 0 - Normal  Psychosocial Note: increasing fatigue per patient  Pain Assessment  0-10 Pain Scale: 0  Pain Note: patient reports intermittent nerve pains of left breast - reviewed, denies need for medication management      Objective:   /71 (BP Location: Left arm, Patient Position: Chair, Cuff Size: Adult Regular)   Pulse 74   Temp 97.6  F (36.4  C) (Oral)   Resp 18   Wt 106.6 kg (235 lb)   SpO2 98%   Gen: Appears well, in no acute distress  Skin: mild erythema, no skin breakdown   CV/Resp: rrr, breathing comfortably on room air  Neuro: CN 2-12 grossly intact, UE/LE full strength     Labs:  CBC RESULTS: No results for input(s): \"WBC\", \"RBC\", \"HGB\", \"HCT\", \"MCV\", \"MCH\", \"MCHC\", \"RDW\", \"PLT\" in the last 53163 hours.  ELECTROLYTES:  No " "results for input(s): \"NA\", \"POTASSIUM\", \"CHLORIDE\", \"MAYA\", \"CO2\", \"BUN\", \"CR\", \"GLC\" in the last 57189 hours.    Assessment:    Tolerating radiation therapy well.  All questions and concerns addressed.      Plan:   Continue current therapy.    Follow up in 3 months post RT      Mosaiq chart and setup information reviewed  Ports checked and MVCT/IGRT images checked    Medication Review  Med list reviewed with patient?: Yes  Med list printed and given: Offered and declined    Educational Topic Discussed  Additional Instructions: follow-up with Dr. Ryan scheduled 9/30/2024 and follow-up with Dr. Valverde in 3 months - scheduling to contact patient  Education Instructions: radiation therapy side effects: fatigue, skin changes and skin cares      Arash Valverde MD  Radiation Oncology   [facility]: [phone]      "

## 2024-08-30 NOTE — PATIENT INSTRUCTIONS
Please contact [facility] RN with questions or concerns following today's appointment: [phone].       Please feel free to leave a detailed message if your call is not answered.    If your call is not received before 3:00 PM, it may not be returned until the following business day.    If you are receiving radiation treatment and need assistance after 3:00 PM or on the weekends, please call [phone] and ask to speak to the radiation oncologist on-call.    Thank you!    [facility] Radiation Oncology Nursing

## 2024-08-30 NOTE — LETTER
2024      Josefa Arnold  [address]  [place] [address]      Dear Colleague,    Thank you for referring your patient, Josefa Arnold, to the [facility] RADIATION ONCOLOGY [place]. Please see a copy of my visit note below.    [facility] PHYSICIANS  SPECIALIZING IN BREAKTHROUGHS  Radiation Oncology    On Treatment Visit Note      Josefa Arnold      Date: Aug 30, 2024   MRN: [id number]   : 1970  Diagnosis: breast cancer ER (+)        ID: Ms. Arnold is a 53 year old female with LEFT Stage 0 DCIS, ER+ s/p breast conservation surgery (Dr. Michaels, 2024) with pathology revealing ductal carcinoma in situ, grade 2, measuring 10 mm in size negative margins. She will be seeing Dr. Ryan for discussion of endocrine therapy.  Plan for 40Gy/15fx without boost.     Reason for Visit:  On Radiation Treatment Visit       Treatment Summary to Date  Treatment Site: left breast Current Dose: 3204/4005 cGy Fractions: 12/15        Chemotherapy  Chemo concurrent with radx?: No (Dr. Ryan)    Subjective:   No complaints, tolerating RT well overall.    Nursing ROS:   Nutrition Alteration  Diet Type: Patient's Preference  Skin  Skin Reaction: 1 - Faint erythema or dry desquamation (no desquamation)  Skin Intervention: patient reports applying Aquaphor two times daily        Cardiovascular  Respiratory effort: 1 - Normal - without distress        Psychosocial  Mood - Anxiety: 0 - Normal  Mood - Depression: 0 - Normal  Psychosocial Note: increasing fatigue per patient  Pain Assessment  0-10 Pain Scale: 0  Pain Note: patient reports intermittent nerve pains of left breast - reviewed, denies need for medication management      Objective:   /71 (BP Location: Left arm, Patient Position: Chair, Cuff Size: Adult Regular)   Pulse 74   Temp 97.6  F (36.4  C) (Oral)   Resp 18   Wt 106.6 kg (235 lb)   SpO2 98%   Gen: Appears well, in no acute distress  Skin: mild erythema, no skin breakdown  "  CV/Resp: rrr, breathing comfortably on room air  Neuro: CN 2-12 grossly intact, UE/LE full strength     Labs:  CBC RESULTS: No results for input(s): \"WBC\", \"RBC\", \"HGB\", \"HCT\", \"MCV\", \"MCH\", \"MCHC\", \"RDW\", \"PLT\" in the last 63945 hours.  ELECTROLYTES:  No results for input(s): \"NA\", \"POTASSIUM\", \"CHLORIDE\", \"MAYA\", \"CO2\", \"BUN\", \"CR\", \"GLC\" in the last 74935 hours.    Assessment:    Tolerating radiation therapy well.  All questions and concerns addressed.      Plan:   Continue current therapy.    Follow up in 3 months post RT      Mosaiq chart and setup information reviewed  Ports checked and MVCT/IGRT images checked    Medication Review  Med list reviewed with patient?: Yes  Med list printed and given: Offered and declined    Educational Topic Discussed  Additional Instructions: follow-up with Dr. Ryan scheduled 9/30/2024 and follow-up with Dr. Valverde in 3 months - scheduling to contact patient  Education Instructions: radiation therapy side effects: fatigue, skin changes and skin cares      Arash Valverde MD  Radiation Oncology   [facility]: [phone]        Again, thank you for allowing me to participate in the care of your patient.        Sincerely,        Arash Valverde MD  "

## 2024-09-03 ENCOUNTER — APPOINTMENT (OUTPATIENT)
Dept: RADIATION ONCOLOGY | Facility: CLINIC | Age: 54
End: 2024-09-03
Payer: COMMERCIAL

## 2024-09-03 PROCEDURE — 77387 GUIDANCE FOR RADJ TX DLVR: CPT | Performed by: RADIOLOGY

## 2024-09-03 PROCEDURE — 77412 RADIATION TX DELIVERY LVL 3: CPT | Performed by: RADIOLOGY

## 2024-09-04 ENCOUNTER — APPOINTMENT (OUTPATIENT)
Dept: RADIATION ONCOLOGY | Facility: CLINIC | Age: 54
End: 2024-09-04
Payer: COMMERCIAL

## 2024-09-04 PROCEDURE — 77412 RADIATION TX DELIVERY LVL 3: CPT | Performed by: RADIOLOGY

## 2024-09-04 PROCEDURE — 77387 GUIDANCE FOR RADJ TX DLVR: CPT | Performed by: RADIOLOGY

## 2024-09-05 ENCOUNTER — APPOINTMENT (OUTPATIENT)
Dept: RADIATION ONCOLOGY | Facility: CLINIC | Age: 54
End: 2024-09-05
Payer: COMMERCIAL

## 2024-09-05 ENCOUNTER — DOCUMENTATION ONLY (OUTPATIENT)
Dept: RADIATION ONCOLOGY | Facility: CLINIC | Age: 54
End: 2024-09-05
Payer: COMMERCIAL

## 2024-09-05 DIAGNOSIS — D05.12 DUCTAL CARCINOMA IN SITU (DCIS) OF LEFT BREAST: Primary | ICD-10-CM

## 2024-09-05 PROCEDURE — 77336 RADIATION PHYSICS CONSULT: CPT | Performed by: RADIOLOGY

## 2024-09-05 PROCEDURE — 77427 RADIATION TX MANAGEMENT X5: CPT | Performed by: SURGERY

## 2024-09-05 PROCEDURE — 77387 GUIDANCE FOR RADJ TX DLVR: CPT | Performed by: RADIOLOGY

## 2024-09-05 PROCEDURE — 77412 RADIATION TX DELIVERY LVL 3: CPT | Performed by: RADIOLOGY

## 2024-09-05 PROCEDURE — 99207 PR NO BILLABLE SERVICE THIS VISIT: CPT | Performed by: SURGERY

## 2024-10-31 NOTE — PROGRESS NOTES
Radiotherapy Treatment Summary              PATIENT: Josefa Arnold  MEDICAL RECORD NO: [id number]   : 1970    DIAGNOSIS / ID:  Ms. Arnold is a 53 year old female with LEFT Stage 0 DCIS, ER+ s/p breast conservation surgery (Dr. Michaels, 2024) with pathology revealing ductal carcinoma in situ, grade 2, measuring 10 mm in size negative margins. She will be seeing Dr. Ryan for discussion of endocrine therapy.  Plan for 40Gy/15fx without boost.      INTENT OF RADIOTHERAPY: Adjuvant    CONCURRENT SYSTEMIC THERAPY: No             SITE OF TREATMENT:  left breast    DATES  OF TREATMENT: 8/15/24-24     TOTAL DOSE OF TREATMENT / FRACTIONS: 40.05Gy/15fx                                          FOLLOW UP PLAN:  Follow up with Radiation Oncology in 3 months   Follow up with Dr. Ryan scheduled 2024     CC  Patient Care Team:  Karla Newby PA-C as PCP - General  Arash Valverde MD as Assigned Cancer Care Provider       Arash Valverde M.D.  Department of Radiation Oncology  [facility]

## 2024-12-20 ENCOUNTER — OFFICE VISIT (OUTPATIENT)
Dept: RADIATION ONCOLOGY | Facility: CLINIC | Age: 54
End: 2024-12-20
Payer: COMMERCIAL

## 2024-12-20 VITALS
OXYGEN SATURATION: 98 % | HEART RATE: 78 BPM | DIASTOLIC BLOOD PRESSURE: 77 MMHG | SYSTOLIC BLOOD PRESSURE: 117 MMHG | TEMPERATURE: 97.9 F | RESPIRATION RATE: 18 BRPM

## 2024-12-20 DIAGNOSIS — I89.0 LYMPHEDEMA: ICD-10-CM

## 2024-12-20 DIAGNOSIS — D05.12 DUCTAL CARCINOMA IN SITU (DCIS) OF LEFT BREAST: Primary | ICD-10-CM

## 2024-12-20 RX ORDER — TIRZEPATIDE 2.5 MG/.5ML
2.5 INJECTION, SOLUTION SUBCUTANEOUS WEEKLY
COMMUNITY
Start: 2024-12-16

## 2024-12-20 RX ORDER — ANASTROZOLE 1 MG/1
1 TABLET ORAL
COMMUNITY
Start: 2024-09-30 | End: 2025-09-25

## 2024-12-20 ASSESSMENT — PAIN SCALES - GENERAL: PAINLEVEL_OUTOF10: MILD PAIN (2)

## 2024-12-20 NOTE — LETTER
2024      Josefa Arnold  [address] [place] [address]      Dear Colleague,    Thank you for referring your patient, Josefa Arnold, to the [facility] RADIATION ONCOLOGY [place]. Please see a copy of my visit note below.         Department of Radiation Oncology  [facility]: Cancer Center  [facility]  [address] [address] [place] [address] [phone]       Radiation Oncology Follow-up Visit  Dec 20, 2024      Josefa Arnold  MRN: [id number]   : 1970     DIAGNOSIS / ID:  Ms. Arnold is a 54 year old female with LEFT Stage 0 DCIS, ER+ s/p breast conservation surgery (Dr. Michaels, 2024) with pathology revealing ductal carcinoma in situ, grade 2, measuring 10 mm in size negative margins. She will be seeing Dr. Ryan for discussion of endocrine therapy.  Plan for 40Gy/15fx without boost.      INTENT OF RADIOTHERAPY: Adjuvant     CONCURRENT SYSTEMIC THERAPY: No              SITE OF TREATMENT:  left breast     DATES  OF TREATMENT: 8/15/24-24      TOTAL DOSE OF TREATMENT / FRACTIONS: 40.05Gy/15fx    INTERVAL SINCE COMPLETION OF RADIATION THERAPY: 3 months    SUBJECTIVE:    Overall, patient is doing well since completing radiation. Denies any significant chest pain, chest tenderness. She has mild tightness but no significant range of motion difficulties, or signs of arm/hand swelling.  She is on  endocrine therapy per Dr. Ryan and is tolerating this well.     PHYSICAL EXAM:  /77 (BP Location: Left arm, Patient Position: Chair, Cuff Size: Adult Large)   Pulse 78   Temp 97.9  F (36.6  C) (Oral)   Resp 18   SpO2 98%   Gen: Alert, in NAD  Eyes: PERRL, EOMI, sclera anicteric  HENT     Head: NC/AT     Ears: No external auricular lesions     Nose/sinus: No rhinorrhea or epistaxis     Oral Cavity/Oropharynx: MMM  Neck: Supple, full ROM, no LAD  Breast: LEFT Breast healing well, incisions CDI, no skin breakdown   Pulm: No  wheezing, stridor or respiratory distress  CV: Well-perfused, no cyanosis, no pedal edema  Abdominal: Soft, nontender, nondistended, no hepatomegaly  Back: No step-offs or pain to palpation along the thoracolumbar spine, no CVA tenderness  Musculoskeletal: Normal bulk and tone   Skin: Normal color and turgor  Neurologic: A/Ox3, CN II-XII intact  Psychiatric: Appropriate mood and affect    LABS AND IMAGING:    Per HPI reviewed and in agreement  I have reviewed notes from Dr Ryan    IMPRESSION: Patient is doing well from a radiation toxicity perspective and has healed well since completing radiation.    PLAN:   Follow up with radiation oncology on an as needed basis  PT/Lymphedema referral for mild tightness in axilla  Follow up with medical oncology for endocrine therapy, mammograms per medical oncology (Dr. Ryan)    Arash Valverde M.D.  Department of Radiation Oncology  [facility]     A total of 40 minutes were spent on this visit, including preparation for this visit, face to face with patient, and medical decision making. Medical decision-making included consideration and possible diagnosis, management options, complex record review, review of diagnostic tests, consideration and discussion of significant complications based up medical history/comorbidities, and discussion with providers involved in care of the patient.        Again, thank you for allowing me to participate in the care of your patient.        Sincerely,        Arash Valverde MD    Electronically signed

## 2024-12-20 NOTE — NURSING NOTE
Oncology Rooming Note    December 20, 2024 12:45 PM   Josefa Arnold is a 54 year old female who presents for:    Chief Complaint   Patient presents with    Cancer     Radiation oncology return visit with Dr. Valverde     Initial Vitals: /77 (BP Location: Left arm, Patient Position: Chair, Cuff Size: Adult Large)   Pulse 78   Temp 97.9  F (36.6  C) (Oral)   Resp 18   SpO2 98%  There is no height or weight on file to calculate BMI. There is no height or weight on file to calculate BSA.  Mild Pain (2) Comment: Data Unavailable   No LMP recorded. Patient is postmenopausal.  Allergies reviewed: Yes  Medications reviewed: Yes    Medications: Medication refills not needed today.  Pharmacy name entered into EPIC: [id number] [place], [place] - [address]    Frailty Screening:   Is the patient here for a new oncology consult visit in cancer care? 2. No      Clinical concerns: patient presents to clinic for return radiation oncology visit with Dr. Valverde, completed radiation on 9/5/2024 and received 4,005 cGy to left breast.  Patient reports tenderness in left lateral chest to touch, reports tightness with range of motion of left upper extremity, reviewed ROM stretching exercises, verbal instructions and demonstration provided and patient verbalized understanding.  Patient reports taking Arimidex as prescribed by Dr. Ryan and next scheduled with medical oncology on 5/21/2025.      Dr. Arash Valverde was notified.    Varsha Rosales, RN BSN OCN CBCN

## 2024-12-20 NOTE — PATIENT INSTRUCTIONS
Please contact [facility] RN with questions or concerns following today's appointment.    If you are a patient of Dr. Valverde call: [phone]   If you are a patient of Dr. Ni call: [phone]    Please feel free to leave a detailed message if your call is not answered.    If your call is not received before 3:00 PM, it may not be returned until the following business day.    If you are receiving radiation treatment and need assistance after 3:00 PM or on the weekends, please call [phone] and ask to speak to the radiation oncologist on-call.    Thank you!    [facility] Radiation Oncology Nursing

## 2024-12-25 NOTE — PROGRESS NOTES
Department of Radiation Oncology  [facility]: Cancer Center  [facility]  [address] [place] [address] [phone]       Radiation Oncology Follow-up Visit  Dec 20, 2024      Josefa Arnold  MRN: [id number]   : 1970     DIAGNOSIS / ID:  Ms. Arnold is a 54 year old female with LEFT Stage 0 DCIS, ER+ s/p breast conservation surgery (Dr. Michaels, 2024) with pathology revealing ductal carcinoma in situ, grade 2, measuring 10 mm in size negative margins. She will be seeing Dr. Ryan for discussion of endocrine therapy.  Plan for 40Gy/15fx without boost.      INTENT OF RADIOTHERAPY: Adjuvant     CONCURRENT SYSTEMIC THERAPY: No              SITE OF TREATMENT:  left breast     DATES  OF TREATMENT: 8/15/24-24      TOTAL DOSE OF TREATMENT / FRACTIONS: 40.05Gy/15fx    INTERVAL SINCE COMPLETION OF RADIATION THERAPY: 3 months    SUBJECTIVE:    Overall, patient is doing well since completing radiation. Denies any significant chest pain, chest tenderness. She has mild tightness but no significant range of motion difficulties, or signs of arm/hand swelling.  She is on  endocrine therapy per Dr. Ryan and is tolerating this well.     PHYSICAL EXAM:  /77 (BP Location: Left arm, Patient Position: Chair, Cuff Size: Adult Large)   Pulse 78   Temp 97.9  F (36.6  C) (Oral)   Resp 18   SpO2 98%   Gen: Alert, in NAD  Eyes: PERRL, EOMI, sclera anicteric  HENT     Head: NC/AT     Ears: No external auricular lesions     Nose/sinus: No rhinorrhea or epistaxis     Oral Cavity/Oropharynx: MMM  Neck: Supple, full ROM, no LAD  Breast: LEFT Breast healing well, incisions CDI, no skin breakdown   Pulm: No wheezing, stridor or respiratory distress  CV: Well-perfused, no cyanosis, no pedal edema  Abdominal: Soft, nontender, nondistended, no hepatomegaly  Back: No step-offs or pain to palpation along the thoracolumbar spine, no CVA tenderness  Musculoskeletal:  Normal bulk and tone   Skin: Normal color and turgor  Neurologic: A/Ox3, CN II-XII intact  Psychiatric: Appropriate mood and affect    LABS AND IMAGING:    Per HPI reviewed and in agreement  I have reviewed notes from Dr Ryan    IMPRESSION: Patient is doing well from a radiation toxicity perspective and has healed well since completing radiation.    PLAN:   Follow up with radiation oncology on an as needed basis  PT/Lymphedema referral for mild tightness in axilla  Follow up with medical oncology for endocrine therapy, mammograms per medical oncology (Dr. Ryan)    Arash Valverde M.D.  Department of Radiation Oncology  [facility]     A total of 40 minutes were spent on this visit, including preparation for this visit, face to face with patient, and medical decision making. Medical decision-making included consideration and possible diagnosis, management options, complex record review, review of diagnostic tests, consideration and discussion of significant complications based up medical history/comorbidities, and discussion with providers involved in care of the patient.

## 2025-02-05 ENCOUNTER — THERAPY VISIT (OUTPATIENT)
Dept: OCCUPATIONAL THERAPY | Facility: CLINIC | Age: 55
End: 2025-02-05
Attending: SURGERY
Payer: COMMERCIAL

## 2025-02-05 DIAGNOSIS — I89.0 LYMPHEDEMA: ICD-10-CM

## 2025-02-05 DIAGNOSIS — D05.12 DUCTAL CARCINOMA IN SITU (DCIS) OF LEFT BREAST: Primary | ICD-10-CM

## 2025-02-05 PROCEDURE — 97140 MANUAL THERAPY 1/> REGIONS: CPT | Mod: GO

## 2025-02-05 PROCEDURE — 97139 UNLISTED THERAPEUTIC PX: CPT | Mod: GO
